# Patient Record
Sex: FEMALE | Race: ASIAN | NOT HISPANIC OR LATINO | Employment: UNEMPLOYED | URBAN - METROPOLITAN AREA
[De-identification: names, ages, dates, MRNs, and addresses within clinical notes are randomized per-mention and may not be internally consistent; named-entity substitution may affect disease eponyms.]

---

## 2017-01-09 ENCOUNTER — GENERIC CONVERSION - ENCOUNTER (OUTPATIENT)
Dept: OTHER | Facility: OTHER | Age: 52
End: 2017-01-09

## 2017-01-20 ENCOUNTER — GENERIC CONVERSION - ENCOUNTER (OUTPATIENT)
Dept: OTHER | Facility: OTHER | Age: 52
End: 2017-01-20

## 2017-01-23 ENCOUNTER — GENERIC CONVERSION - ENCOUNTER (OUTPATIENT)
Dept: OTHER | Facility: OTHER | Age: 52
End: 2017-01-23

## 2017-02-27 ENCOUNTER — ALLSCRIPTS OFFICE VISIT (OUTPATIENT)
Dept: OTHER | Facility: OTHER | Age: 52
End: 2017-02-27

## 2017-02-27 DIAGNOSIS — D64.9 ANEMIA: ICD-10-CM

## 2017-10-19 ENCOUNTER — GENERIC CONVERSION - ENCOUNTER (OUTPATIENT)
Dept: OTHER | Facility: OTHER | Age: 52
End: 2017-10-19

## 2017-10-19 ENCOUNTER — ALLSCRIPTS OFFICE VISIT (OUTPATIENT)
Dept: OTHER | Facility: OTHER | Age: 52
End: 2017-10-19

## 2017-10-19 DIAGNOSIS — Z12.31 ENCOUNTER FOR SCREENING MAMMOGRAM FOR MALIGNANT NEOPLASM OF BREAST: ICD-10-CM

## 2017-10-19 DIAGNOSIS — R53.83 OTHER FATIGUE: ICD-10-CM

## 2017-10-19 DIAGNOSIS — D64.9 ANEMIA: ICD-10-CM

## 2018-01-13 NOTE — PROGRESS NOTES
Assessment    1  Encounter for preventive health examination (V70 0) (Z00 00)   2  Anemia (285 9) (D64 9)    Plan  Anemia    · Ferrous Sulfate 324 (65 Fe) MG Oral Tablet Delayed Release; 1 TABLET TWICE  DAILY   · (1) CBC/PLT/DIFF; Status:Active; Requested for:29Aor2219;    · Call [phone] if: You feel unusually tired ; Status:Complete;   Done: 20HQQ8065   · Call [phone] if: Your skin, lips, or fingernails appear pale ; Status:Complete;    Done: 65BKO1113   · Seek Immediate Medical Attention if: You see any blood in the stool ; Status:Complete;    Done: [address] Maintenance    · Follow-up visit in 1 year Evaluation and Treatment  Follow-up  Status: Hold For -  Scheduling  Requested for: 71Pis0916   · Drink plenty of fluids ; Status:Complete;   Done: 47YDB5296   · We encourage all of our patients to exercise regularly  30 minutes of exercise or physical  activity five or more days a week is recommended for children and adults ;  Status:Complete;   Done: 52SUY7793   · We recommend routine visits to a dentist ; Status:Complete;   Done: 48SLV3685   · Call [phone] if: You find a new or different kind of lump in your breast ;  Status:Complete;   Done: 72OXI5492   · Call [phone] if: You have any warning signs of skin cancer ; Status:Complete;    Done: 79KIC0964   · Call [phone] if: You experience a new kind of chest pain (angina) or pressure ;  Status:Complete;   Done: 63EEC7575    Discussion/Summary  health maintenance visit Currently, she eats a healthy diet  the risks and benefits of cervical cancer screening were discussed cervical cancer screening is current next cervical cancer screening is due 2018 Breast cancer screening: mammogram is current  Colorectal cancer screening: colorectal cancer screening is current  Screening lab work includes hemoglobin  The patient declines immunizations       Health maintenance: up to date with screening colonoscopy, pap, breast  Next pap due in 2018  Lightheadedness due to anemia: refill iron, recheck labs  L inguinal/hip pain: due to ligamental strain; continue supportive care  Follow up 1 year  Self Referrals: No      Chief Complaint  Routine health maintenance      History of Present Illness  HM, Adult Female: The patient is being seen for a health maintenance evaluation  The last health maintenance visit was 3 year(s) ago  General Health: The patient's health since the last visit is described as fair  She does not have regular dental visits  She denies vision problems  She denies hearing loss  Immunizations status: not up to date  Lifestyle:  She consumes a diverse and healthy diet  She does not have any weight concerns  She does not exercise regularly  She does not use tobacco  She denies alcohol use  She denies drug use  Reproductive health: the patient is perimenopausal   she reports abnormal menses  she is sexually active  pregnancy history:  period 1-2 per month, now slowing down,   Screening: cancer screening reviewed and updated  metabolic screening reviewed and updated  HPI: Lightheadedness still  Sometime takes iron, sometime doesn't  Patient has period once or sometime twice a month  Skit on ice on L hip pain for a month  Pain is at L groin, relieved with some mediational oil  NO radiculopathy  Review of Systems    Constitutional: feeling tired  Eyes: No complaints of eye pain, no red eyes, no eyesight problems, no discharge, no dry eyes, no itching of eyes  ENT: no complaints of earache, no loss of hearing, no nose bleeds, no nasal discharge, no sore throat, no hoarseness  Cardiovascular: No complaints of slow heart rate, no fast heart rate, no chest pain, no palpitations, no leg claudication, no lower extremity edema  Respiratory: No complaints of shortness of breath, no wheezing, no cough, no SOB on exertion, no orthopnea, no PND     Gastrointestinal: No complaints of abdominal pain, no constipation, no nausea or vomiting, no diarrhea, no bloody stools  Musculoskeletal: limb pain, but as noted in HPI  Integumentary: No complaints of skin rash or lesions, no itching, no skin wounds, no breast pain or lump  Neurological: dizziness, but as noted in HPI  Psychiatric: Not suicidal, no sleep disturbance, no anxiety or depression, no change in personality, no emotional problems  Endocrine: No complaints of proptosis, no hot flashes, no muscle weakness, no deepening of the voice, no feelings of weakness  Active Problems    1  Anemia (285 9) (D64 9)   2  Colon cancer screening (V76 51) (Z12 11)   3  Encounter for routine gynecological examination (V72 31) (Z01 419)   4  Encounter for screening mammogram for breast cancer (V76 12) (Z12 31)   5  Esophageal reflux (530 81) (K21 9)   6  External Hemorrhoids (455 3)   7  Fatigue (780 79) (R53 83)   8  Premenopause menorrhagia (627 0) (N92 4)   9  Screening for diabetes mellitus (DM) (V77 1) (Z13 1)   10  Screening for lipoid disorders (V77 91) (Z13 220)   11  Urinary frequency (788 41) (R35 0)    Family History  Father    · Family history of skin cancer (V16 8) (Z80 8)    Social History    · Never a smoker   · No alcohol use    Current Meds   1  Ferrous Sulfate 324 (65 Fe) MG Oral Tablet Delayed Release; 1 TABLET TWICE DAILY; Therapy: 25PVA5290 to (Last Rx:07Oct2016)  Requested for: 84JXJ2762 Ordered    Allergies    1  No Known Drug Allergies    Vitals   Recorded: 47Wtr0013 10:50AM   Temperature 97 F   Heart Rate 80   Respiration 16   Systolic 543   Diastolic 60   Weight 699 lb 6 oz   BMI Calculated 24 42   BSA Calculated 1 62     Physical Exam    Constitutional   General appearance: No acute distress, well appearing and well nourished  Head and Face   Head and face: Normal     Eyes   Conjunctiva and lids: No swelling, erythema or discharge      Ears, Nose, Mouth, and Throat   External inspection of ears and nose: Normal     Otoscopic examination: Tympanic membranes translucent with normal light reflex  Canals patent without erythema  Oropharynx: Normal with no erythema, edema, exudate or lesions  Neck   Neck: Supple, symmetric, trachea midline, no masses  Thyroid: Normal, no thyromegaly  Pulmonary   Respiratory effort: No increased work of breathing or signs of respiratory distress  Auscultation of lungs: Clear to auscultation  Cardiovascular   Auscultation of heart: Normal rate and rhythm, normal S1 and S2, no murmurs  Examination of extremities for edema and/or varicosities: Normal     Abdomen   Abdomen: Non-tender, no masses  Liver and spleen: No hepatomegaly or splenomegaly  Lymphatic   Palpation of lymph nodes in neck: No lymphadenopathy  Musculoskeletal   Gait and station: Normal     Digits and nails: Normal without clubbing or cyanosis  Joints, bones, and muscles: Abnormal   Pain at L inguinal ligament and hip on external rotation  Range of motion: Normal     Stability: Normal     Muscle strength/tone: Normal     Skin   Skin and subcutaneous tissue: Normal without rashes or lesions  Psychiatric   Judgment and insight: Normal     Orientation to person, place, and time: Normal     Recent and remote memory: Intact      Mood and affect: Normal        Signatures   Electronically signed by : Verner Muff, M D ; Feb 28 2017 11:06AM EST                       (Author)

## 2018-01-14 VITALS
SYSTOLIC BLOOD PRESSURE: 100 MMHG | RESPIRATION RATE: 16 BRPM | TEMPERATURE: 97 F | DIASTOLIC BLOOD PRESSURE: 60 MMHG | BODY MASS INDEX: 24.42 KG/M2 | WEIGHT: 134.38 LBS | HEART RATE: 80 BPM

## 2018-01-22 ENCOUNTER — APPOINTMENT (OUTPATIENT)
Dept: LAB | Facility: CLINIC | Age: 53
End: 2018-01-22
Payer: COMMERCIAL

## 2018-01-22 ENCOUNTER — HOSPITAL ENCOUNTER (OUTPATIENT)
Dept: MAMMOGRAPHY | Facility: HOSPITAL | Age: 53
Discharge: HOME | End: 2018-01-22
Payer: COMMERCIAL

## 2018-01-22 VITALS
BODY MASS INDEX: 25.2 KG/M2 | SYSTOLIC BLOOD PRESSURE: 122 MMHG | TEMPERATURE: 98.2 F | HEART RATE: 74 BPM | RESPIRATION RATE: 14 BRPM | DIASTOLIC BLOOD PRESSURE: 72 MMHG | HEIGHT: 63 IN | WEIGHT: 142.25 LBS

## 2018-01-22 DIAGNOSIS — R53.83 OTHER FATIGUE: ICD-10-CM

## 2018-01-22 DIAGNOSIS — Z12.31 ENCOUNTER FOR SCREENING MAMMOGRAM FOR MALIGNANT NEOPLASM OF BREAST: ICD-10-CM

## 2018-01-22 DIAGNOSIS — D64.9 ANEMIA: ICD-10-CM

## 2018-01-22 LAB
BASOPHILS # BLD AUTO: 0.02 THOUSANDS/ΜL (ref 0–0.1)
BASOPHILS NFR BLD AUTO: 0 % (ref 0–1)
EOSINOPHIL # BLD AUTO: 0.1 THOUSAND/ΜL (ref 0–0.61)
EOSINOPHIL NFR BLD AUTO: 1 % (ref 0–6)
ERYTHROCYTE [DISTWIDTH] IN BLOOD BY AUTOMATED COUNT: 22.1 % (ref 11.6–15.1)
FERRITIN SERPL-MCNC: 600 NG/ML (ref 8–388)
HCT VFR BLD AUTO: 31.6 % (ref 34.8–46.1)
HGB BLD-MCNC: 9.3 G/DL (ref 11.5–15.4)
IRON SERPL-MCNC: 125 UG/DL (ref 50–170)
LYMPHOCYTES # BLD AUTO: 1.81 THOUSANDS/ΜL (ref 0.6–4.47)
LYMPHOCYTES NFR BLD AUTO: 24 % (ref 14–44)
MCH RBC QN AUTO: 18.5 PG (ref 26.8–34.3)
MCHC RBC AUTO-ENTMCNC: 29.4 G/DL (ref 31.4–37.4)
MCV RBC AUTO: 63 FL (ref 82–98)
MONOCYTES # BLD AUTO: 0.45 THOUSAND/ΜL (ref 0.17–1.22)
MONOCYTES NFR BLD AUTO: 6 % (ref 4–12)
NEUTROPHILS # BLD AUTO: 5.24 THOUSANDS/ΜL (ref 1.85–7.62)
NEUTS SEG NFR BLD AUTO: 69 % (ref 43–75)
NRBC BLD AUTO-RTO: 0 /100 WBCS
PLATELET # BLD AUTO: 352 THOUSANDS/UL (ref 149–390)
RBC # BLD AUTO: 5.03 MILLION/UL (ref 3.81–5.12)
TSH SERPL DL<=0.05 MIU/L-ACNC: 1.97 UIU/ML (ref 0.36–3.74)
WBC # BLD AUTO: 7.65 THOUSAND/UL (ref 4.31–10.16)

## 2018-01-22 PROCEDURE — 77063 BREAST TOMOSYNTHESIS BI: CPT

## 2018-01-22 PROCEDURE — 82728 ASSAY OF FERRITIN: CPT

## 2018-01-22 PROCEDURE — 85025 COMPLETE CBC W/AUTO DIFF WBC: CPT

## 2018-01-22 PROCEDURE — 36415 COLL VENOUS BLD VENIPUNCTURE: CPT

## 2018-01-22 PROCEDURE — 84443 ASSAY THYROID STIM HORMONE: CPT

## 2018-01-22 PROCEDURE — 83540 ASSAY OF IRON: CPT

## 2018-01-22 PROCEDURE — 77067 SCR MAMMO BI INCL CAD: CPT

## 2018-07-13 ENCOUNTER — OFFICE VISIT (OUTPATIENT)
Dept: FAMILY MEDICINE CLINIC | Facility: CLINIC | Age: 53
End: 2018-07-13
Payer: COMMERCIAL

## 2018-07-13 VITALS
RESPIRATION RATE: 16 BRPM | TEMPERATURE: 95.9 F | HEART RATE: 80 BPM | DIASTOLIC BLOOD PRESSURE: 64 MMHG | WEIGHT: 141.6 LBS | BODY MASS INDEX: 25.09 KG/M2 | SYSTOLIC BLOOD PRESSURE: 100 MMHG | HEIGHT: 63 IN

## 2018-07-13 DIAGNOSIS — Z11.1 PPD SCREENING TEST: ICD-10-CM

## 2018-07-13 DIAGNOSIS — L85.3 DRY SKIN: ICD-10-CM

## 2018-07-13 DIAGNOSIS — Z02.1 ENCOUNTER FOR PRE-EMPLOYMENT EXAMINATION: Primary | ICD-10-CM

## 2018-07-13 PROBLEM — Z00.00 HEALTH CARE MAINTENANCE: Status: ACTIVE | Noted: 2018-07-13

## 2018-07-13 PROCEDURE — 99213 OFFICE O/P EST LOW 20 MIN: CPT | Performed by: FAMILY MEDICINE

## 2018-07-13 PROCEDURE — 86580 TB INTRADERMAL TEST: CPT | Performed by: FAMILY MEDICINE

## 2018-07-13 RX ORDER — PETROLATUM,WHITE
1 OINTMENT IN PACKET (GRAM) TOPICAL ONCE
Qty: 212 G | Refills: 1 | Status: SHIPPED | OUTPATIENT
Start: 2018-07-13 | End: 2018-07-13

## 2018-07-13 RX ORDER — FERROUS SULFATE 325(65) MG
1 TABLET ORAL 2 TIMES DAILY
COMMUNITY
Start: 2016-09-29 | End: 2019-11-04 | Stop reason: SDUPTHER

## 2018-07-13 NOTE — ASSESSMENT & PLAN NOTE
- generally healthy, no medical complaints except for h/o PUD 6 years ago, currently asymptomatic   - on Ampicillin due to recently undergoing dental procedure 7/5/18, no significantly findings  --> come back on Monday for PPD read then I will complete the form

## 2018-07-13 NOTE — PROGRESS NOTES
Henry Hinojosa 1965 female MRN: [id number]    Family Medicine Acute Visit    ASSESSMENT/PLAN   Problem List Items Addressed This Visit        Other    Encounter for pre-employment examination - Primary     - generally healthy, no medical complaints except for h/o PUD 6 years ago, currently asymptomatic   - on Ampicillin due to recently undergoing dental procedure 7/5/18, no significantly findings  --> come back on Monday for PPD read then I will complete the form               Dry skin     - lotion application instructions given today                   Future Appointments  Date Time Provider Brigitte Dill   7/16/2018 11:00 AM Elke CHRISTIANSON  Practice[facility]          SUBJECTIVE  CC: Physical Exam      HPI:  Karla Li is a 45 y o  female who presents for pre-employment PE   PT stated she is doing well, eating/drinking/sleeping well  No HA, weakness, paresthesia, viral infection, fever, nightsweat, CP, SOB, abd pain  No changes in stool or weight  No n/v  No travel recently  Review of Systems   Constitutional: Negative for activity change, appetite change, chills, diaphoresis and fever  HENT: Negative for ear discharge, ear pain, hearing loss, rhinorrhea, sinus pain, sneezing, sore throat, tinnitus and trouble swallowing  Eyes: Negative for photophobia, pain, discharge, redness and visual disturbance  Respiratory: Negative for cough, shortness of breath and wheezing  Cardiovascular: Negative for chest pain and palpitations  Gastrointestinal: Negative for abdominal distention, abdominal pain, constipation, diarrhea, nausea and vomiting  Genitourinary: Negative for dysuria, flank pain, hematuria and vaginal pain  Musculoskeletal: Negative for back pain, neck pain and neck stiffness  Skin: Negative for pallor and rash  Neurological: Negative for seizures, syncope, weakness, light-headedness, numbness and headaches  Hematological: Does not bruise/bleed easily  Psychiatric/Behavioral: Negative for dysphoric mood  The patient is not nervous/anxious  Historical Information   The patient history was reviewed as follows:  No past medical history on file  No past surgical history on file  Family History   Problem Relation Age of Onset    Skin cancer Father       Social History   History   Alcohol Use No     History   Drug use: Unknown     History   Smoking Status    Never Smoker   Smokeless Tobacco    Not on file       Medications:     Current Outpatient Prescriptions:     ferrous sulfate 325 (65 Fe) mg tablet, Take 1 tablet by mouth 2 (two) times a day, Disp: , Rfl:     Allergies not on file    OBJECTIVE  Vitals:   Vitals:    07/13/18 1123   BP: 100/64   Pulse: 80   Resp: 16   Temp: (!) 95 9 °F (35 5 °C)   Weight: 64 2 kg (141 lb 9 6 oz)   Height: 5' 3" (1 6 m)         Physical Exam   Constitutional: She is oriented to person, place, and time  She appears well-developed and well-nourished  No distress  HENT:   Head: Normocephalic and atraumatic  Right Ear: External ear normal    Left Ear: External ear normal    Nose: Nose normal    Mouth/Throat: Oropharynx is clear and moist  No oropharyngeal exudate  No tenderness, bleeding, erythema, pain, edema on gum, teeth   Eyes: Conjunctivae and EOM are normal  Pupils are equal, round, and reactive to light  Right eye exhibits no discharge  Left eye exhibits no discharge  No scleral icterus  Neck: Normal range of motion  Neck supple  No JVD present  No thyromegaly present  Cardiovascular: Normal rate, regular rhythm and normal heart sounds  Exam reveals no gallop and no friction rub  No murmur heard  Pulmonary/Chest: Effort normal and breath sounds normal  No respiratory distress  She has no wheezes  She has no rales  Abdominal: Soft  Bowel sounds are normal  She exhibits no distension  There is no tenderness  There is no rebound and no guarding     Genitourinary: Vagina normal    Musculoskeletal: Normal range of motion  She exhibits no edema, tenderness or deformity  Lymphadenopathy:     She has no cervical adenopathy  Neurological: She is alert and oriented to person, place, and time  No cranial nerve deficit  Motor strength 5/5 thoughout   Sensation intact and symmetric  Cn 2-12 intact     Skin: Skin is dry  No rash noted  She is not diaphoretic  No erythema  Dry skin, some excoriation on abdomen   Psychiatric: She has a normal mood and affect  Thought content normal    Vitals reviewed         Papo Padron MD, PGY-2  [facility]   7/13/2018

## 2018-07-16 ENCOUNTER — CLINICAL SUPPORT (OUTPATIENT)
Dept: FAMILY MEDICINE CLINIC | Facility: CLINIC | Age: 53
End: 2018-07-16

## 2018-07-16 DIAGNOSIS — Z11.1 PPD SCREENING TEST: ICD-10-CM

## 2018-07-16 DIAGNOSIS — R76.11 POSITIVE SKIN TEST FOR TUBERCULOSIS: Primary | ICD-10-CM

## 2018-07-16 LAB
INDURATION: 15 MM
TB SKIN TEST: POSITIVE

## 2018-07-18 LAB
M TB IFN-G CD4+ BCKGRND COR BLD-ACNC: 0.63 IU/ML
M TB IFN-G CD4+ T-CELLS BLD-ACNC: 0.05 IU/ML
M TB TUBERC IFN-G BLD QL: POSITIVE
M TB TUBERC IGNF/MITOGEN IGNF CONTROL: >10 IU/ML

## 2018-07-20 ENCOUNTER — HOSPITAL ENCOUNTER (OUTPATIENT)
Dept: RADIOLOGY | Facility: HOSPITAL | Age: 53
Discharge: HOME | End: 2018-07-20
Payer: COMMERCIAL

## 2018-07-20 ENCOUNTER — TRANSCRIBE ORDERS (OUTPATIENT)
Dept: ADMINISTRATIVE | Facility: HOSPITAL | Age: 53
End: 2018-07-20

## 2018-07-20 DIAGNOSIS — R76.11 POSITIVE PPD: ICD-10-CM

## 2018-07-20 DIAGNOSIS — R76.11 POSITIVE PPD: Primary | ICD-10-CM

## 2018-07-20 PROCEDURE — 71046 X-RAY EXAM CHEST 2 VIEWS: CPT

## 2018-07-20 NOTE — PROGRESS NOTES
PPD 15 and QuantiFERON (+); Pt stated that she had never had positive PPD before  She underwent immigration process before immigrating to the [address] [place] [address] [address] which was negative for both active and latent TB  Never had BCG vaccine before  I talked with the patient over the phone, requested to her to obtain CXR ASAP, will decide the treatment after having the result  Also talked with her  over the phone, encouraged him to screen for TB infection as well  However, he strongly stated that he believes everyone from [facility] must have positive PPD and almost [place] has active TB, and he does not trust the result  Discussed with Dr Colt Peck

## 2018-07-23 ENCOUNTER — TELEPHONE (OUTPATIENT)
Dept: FAMILY MEDICINE CLINIC | Facility: CLINIC | Age: 53
End: 2018-07-23

## 2018-07-23 NOTE — TELEPHONE ENCOUNTER
Pt came to the office looking for information regarding her CXR and quantiferon gold test q gold was positive just waiting for the reading on the CXR I am not sure if you would like to

## 2018-07-23 NOTE — TELEPHONE ENCOUNTER
Pt CXR is back and is negative  With positive quantiferon if you can call pt or I can to give treatment plan, pt want a call at [phone] and if no answer call her at [phone]   Thank you

## 2018-08-02 DIAGNOSIS — R76.11 POSITIVE PPD: Primary | ICD-10-CM

## 2018-08-02 NOTE — PROGRESS NOTES
I called the patient and left the message that she needs to go see ID doctor, Dr Howard Early, regarding her positive PPD and Quantiferon, negative CXR  She can either  the referral or have it mailed to her

## 2018-08-02 NOTE — TELEPHONE ENCOUNTER
I saw her dad this morning, and she came in with him  I gave her a referral to Dr Pam Taylor, infectious disease

## 2018-08-20 ENCOUNTER — TELEPHONE (OUTPATIENT)
Dept: FAMILY MEDICINE CLINIC | Facility: CLINIC | Age: 53
End: 2018-08-20

## 2018-09-13 ENCOUNTER — OFFICE VISIT (OUTPATIENT)
Dept: INFECTIOUS DISEASES | Facility: CLINIC | Age: 53
End: 2018-09-13
Payer: COMMERCIAL

## 2018-09-13 VITALS
HEIGHT: 62 IN | HEART RATE: 72 BPM | WEIGHT: 144 LBS | DIASTOLIC BLOOD PRESSURE: 78 MMHG | TEMPERATURE: 97.5 F | SYSTOLIC BLOOD PRESSURE: 112 MMHG | BODY MASS INDEX: 26.5 KG/M2

## 2018-09-13 DIAGNOSIS — R76.11 POSITIVE PPD: ICD-10-CM

## 2018-09-13 DIAGNOSIS — Z22.7 TB LUNG, LATENT: ICD-10-CM

## 2018-09-13 DIAGNOSIS — R76.12 POSITIVE QUANTIFERON-TB GOLD TEST: ICD-10-CM

## 2018-09-13 PROCEDURE — 99244 OFF/OP CNSLTJ NEW/EST MOD 40: CPT | Performed by: INTERNAL MEDICINE

## 2018-09-13 RX ORDER — PYRIDOXINE HCL (VITAMIN B6) 50 MG
50 TABLET ORAL DAILY
Qty: 90 TABLET | Refills: 2 | Status: SHIPPED | OUTPATIENT
Start: 2018-09-13 | End: 2018-09-14 | Stop reason: SDUPTHER

## 2018-09-13 RX ORDER — ISONIAZID 300 MG/1
300 TABLET ORAL DAILY
Qty: 90 TABLET | Refills: 2 | Status: SHIPPED | OUTPATIENT
Start: 2018-09-13 | End: 2018-09-14 | Stop reason: SDUPTHER

## 2018-09-13 NOTE — PROGRESS NOTES
Consultation - Infectious Disease   Karla Jacobsen 52 y o  female MRN: [id number]  Unit/Bed#:  Encounter: [id number]      IMPRESSION & RECOMMENDATIONS:   Impression/Recommendations: This is a 52 y o  female, otherwise healthy, immigrated from [facility] in 1987, via immigration camp in [facility], now has positive PPD and TB QuantiFERON gold  1    Latent TB  Infection was probably during her time in bed hand or immigration camp, especially given negative PPD in a 59-year-old daughter and Sinai Clancy positive PPD on entry to [address] [place] [address]  patient has no clinical signs of active TB  CXR is normal   LFTs were normal 3 years ago  Will repeat prior to [address]  Will start patient on daily INH  Start  mg daily x 9 months  Start pyridoxine 50 mg daily with INH  Will check pre treatment LFTs  If pre treat LFTs are normal, there is no need for routine serial LFTs, unless patient developed GI symptoms  Follow-up in 3 months  Outpatient records reviewed in detail  Discussed with patient in detail regarding above plan  Follow-up in 3 months  Thank you for this consultation  HISTORY OF PRESENT ILLNESS:  Reason for Consult:   Latent TB     HPI: Karla Jacobsen is a 52 y o  female, otherwise healthy, had routine PPD done for insurance reasons  This was positive  This was follow with QuantiFERON TB gold, which was also positive  For these reasons, she was referred here for evaluation  Patient feels well  No respiratory symptoms  No constitutional symptoms  Patient is from [facility], came to [address] [place] [address]  in [address] [place] [address] via immigration camp in [facility] for 3 years  She describes a very crowded and dirty and farm and there  [place] having a PPD that was weakly positive   Patient denies any known TB exposure  She has a 59-year-old daughter who had negative PPD last year  REVIEW OF SYSTEMS:  A complete 12 point system-based review of systems was done    Except for what is noted in HPI above, ROS is otherwise negative  PAST MEDICAL HISTORY:  No past medical history on file  No past surgical history on file  Problem list reviewed  FAMILY HISTORY:  Non-contributory    SOCIAL HISTORY:  History   Alcohol Use No     History   Drug use: Unknown     History   Smoking Status    Never Smoker   Smokeless Tobacco    Never Used       ALLERGIES:  Allergies   Allergen Reactions    Motrin [Ibuprofen] Other (See Comments)     Upset stomach       MEDICATIONS:  All current active medications have been reviewed  Patient is currently not on any antibiotic  PHYSICAL EXAM:  Vitals:  Temperature: 97 5 °F (36 4 °C)     Physical Exam:  General:  Well-nourished, well-developed, in no acute distress  Awake, alert and oriented x 3  Eyes:  Conjunctive clear with no hemorrhages or effusions  Oropharynx:  No ulcers, no lesions, pharynx benign, no tonsillitis  Neck:  Supple, no lymphadenopathy, no mass, nontender  Lungs:  Expansion symmetric, no rales, no wheezing, no accessory muscle use  Cardiac:  Regular rate and rhythm, normal S1, normal S2, no murmurs  Abdomen:  Soft, nondistended, non-tender, no HSM  Extremities:  No edema, no erythema, nontender  No ulcers  Skin:  No rashes, no ulcers  Neurological:  Moves all four extremities spontaneously, sensation grossly intact    LABS, IMAGING, & OTHER STUDIES:  Lab Results:  I have personally reviewed pertinent labs  Imaging Studies:   I have personally reviewed pertinent imaging study reports and images in PACS  CXR reviewed personally  No infiltrates or consolidations  EKG, Pathology, and Other Studies:   I have personally reviewed pertinent reports

## 2018-09-14 DIAGNOSIS — Z22.7 TB LUNG, LATENT: ICD-10-CM

## 2018-09-14 RX ORDER — PYRIDOXINE HCL (VITAMIN B6) 50 MG
50 TABLET ORAL DAILY
Qty: 90 TABLET | Refills: 0 | Status: SHIPPED | OUTPATIENT
Start: 2018-09-14 | End: 2019-06-11

## 2018-09-14 RX ORDER — ISONIAZID 300 MG/1
300 TABLET ORAL DAILY
Qty: 90 TABLET | Refills: 0 | Status: SHIPPED | OUTPATIENT
Start: 2018-09-14 | End: 2018-12-18 | Stop reason: SDUPTHER

## 2018-12-13 ENCOUNTER — OFFICE VISIT (OUTPATIENT)
Dept: INFECTIOUS DISEASES | Facility: CLINIC | Age: 53
End: 2018-12-13
Payer: COMMERCIAL

## 2018-12-13 VITALS
DIASTOLIC BLOOD PRESSURE: 62 MMHG | HEIGHT: 62 IN | WEIGHT: 142 LBS | HEART RATE: 70 BPM | SYSTOLIC BLOOD PRESSURE: 110 MMHG | RESPIRATION RATE: 16 BRPM | BODY MASS INDEX: 26.13 KG/M2 | TEMPERATURE: 97 F

## 2018-12-13 DIAGNOSIS — Z22.7 TB LUNG, LATENT: Primary | ICD-10-CM

## 2018-12-13 PROCEDURE — 99214 OFFICE O/P EST MOD 30 MIN: CPT | Performed by: INTERNAL MEDICINE

## 2018-12-13 NOTE — PROGRESS NOTES
Progress Note - Infectious Disease   Karla Brown 52 y o  female MRN: [id number]  Unit/Bed#:  Encounter: [id number]      Impression/Recommendations:  1  Latent TB  Infection was probably during her time in bed hand or immigration camp, especially given negative PPD in a 17-year-old daughter and Gurinder Alvarado positive PPD on entry to [address] [place] [address]  patient has no clinical signs of active TB  CXR is normal   LFTs were normal  Patient is tolerating INH well  Continue INH with pyridoxine  Treat x9 months total, another 6 months      Reviewed treatment plan with patient again in detail  Follow-up with me p r n  Antibiotics:  INH with pyridoxine x3 months  Subjective:  Patient is tolerating INH well  She is taking pyridoxine with it  No nausea, vomiting or diarrhea  Appetite is good  No abdominal pain  No numbness or paresthesia  The following portions of the patient's history were reviewed and updated as appropriate: allergies, current medications, past medical history, past social history, past surgical history and problem list     Objective:  Vitals:  Temperature: (!) 97 °F (36 1 °C)    Physical Exam:     General: Awake, alert, cooperative, no distress  Neck:  Supple  No lymphadenopathy  No mass  Lungs: Expansion symmetric, no rales, no wheezing, respirations unlabored  Heart:  Regular rate and rhythm, S1 and S2 normal, no murmur  Abdomen: Soft, nondistended, non-tender, bowel sounds active all four quadrants,        no masses, no organomegaly  Extremities: No edema  No erythema/warmth  No ulcer  Nontender to palpation  Skin:  No rash  Neuro: Moves all extremities  Invasive Devices          No matching active lines, drains, or airways          Labs studies:   I have personally reviewed pertinent labs  Imaging Studies:   I have personally reviewed pertinent imaging study reports and images in PACS  EKG, Pathology, and Other Studies:   I have personally reviewed pertinent reports

## 2018-12-18 DIAGNOSIS — Z22.7 TB LUNG, LATENT: ICD-10-CM

## 2018-12-18 RX ORDER — PYRIDOXINE HCL (VITAMIN B6) 50 MG
50 TABLET ORAL DAILY
Qty: 90 TABLET | Refills: 1 | Status: SHIPPED | OUTPATIENT
Start: 2018-12-18 | End: 2019-03-18

## 2018-12-18 RX ORDER — ISONIAZID 300 MG/1
300 TABLET ORAL DAILY
Qty: 90 TABLET | Refills: 1 | Status: SHIPPED | OUTPATIENT
Start: 2018-12-18 | End: 2019-03-18

## 2019-03-13 ENCOUNTER — OFFICE VISIT (OUTPATIENT)
Dept: FAMILY MEDICINE CLINIC | Facility: CLINIC | Age: 54
End: 2019-03-13

## 2019-03-13 VITALS
WEIGHT: 143.2 LBS | DIASTOLIC BLOOD PRESSURE: 70 MMHG | HEART RATE: 74 BPM | HEIGHT: 62 IN | BODY MASS INDEX: 26.35 KG/M2 | SYSTOLIC BLOOD PRESSURE: 110 MMHG | RESPIRATION RATE: 14 BRPM | TEMPERATURE: 98.2 F

## 2019-03-13 DIAGNOSIS — R31.9 URINARY TRACT INFECTION WITH HEMATURIA, SITE UNSPECIFIED: Primary | ICD-10-CM

## 2019-03-13 DIAGNOSIS — N39.0 URINARY TRACT INFECTION WITH HEMATURIA, SITE UNSPECIFIED: Primary | ICD-10-CM

## 2019-03-13 LAB
SL AMB  POCT GLUCOSE, UA: ABNORMAL
SL AMB LEUKOCYTE ESTERASE,UA: ABNORMAL
SL AMB POCT BILIRUBIN,UA: ABNORMAL
SL AMB POCT BLOOD,UA: ABNORMAL
SL AMB POCT CLARITY,UA: ABNORMAL
SL AMB POCT COLOR,UA: YELLOW
SL AMB POCT KETONES,UA: ABNORMAL
SL AMB POCT NITRITE,UA: POSITIVE
SL AMB POCT PH,UA: 6
SL AMB POCT SPECIFIC GRAVITY,UA: 1.01
SL AMB POCT URINE PROTEIN: ABNORMAL
SL AMB POCT UROBILINOGEN: 0.2

## 2019-03-13 PROCEDURE — 99213 OFFICE O/P EST LOW 20 MIN: CPT | Performed by: FAMILY MEDICINE

## 2019-03-13 PROCEDURE — 81003 URINALYSIS AUTO W/O SCOPE: CPT | Performed by: FAMILY MEDICINE

## 2019-03-13 PROCEDURE — 87086 URINE CULTURE/COLONY COUNT: CPT | Performed by: STUDENT IN AN ORGANIZED HEALTH CARE EDUCATION/TRAINING PROGRAM

## 2019-03-13 PROCEDURE — 3008F BODY MASS INDEX DOCD: CPT | Performed by: FAMILY MEDICINE

## 2019-03-13 PROCEDURE — 87077 CULTURE AEROBIC IDENTIFY: CPT | Performed by: STUDENT IN AN ORGANIZED HEALTH CARE EDUCATION/TRAINING PROGRAM

## 2019-03-13 PROCEDURE — 87186 SC STD MICRODIL/AGAR DIL: CPT | Performed by: STUDENT IN AN ORGANIZED HEALTH CARE EDUCATION/TRAINING PROGRAM

## 2019-03-13 RX ORDER — SULFAMETHOXAZOLE AND TRIMETHOPRIM 800; 160 MG/1; MG/1
1 TABLET ORAL EVERY 12 HOURS SCHEDULED
Qty: 14 TABLET | Refills: 0 | Status: SHIPPED | OUTPATIENT
Start: 2019-03-13 | End: 2019-03-20

## 2019-03-13 NOTE — ASSESSMENT & PLAN NOTE
Vital stable in office  Patient is afebrile this morning  Urine dipstick positive for nitrites, leukocytes, trace blood, protein  Urine sent for urine culture  Requisition given to patient to CBC and CMP  Start Bactrim DS, 1 tablets p o  Q 12h x7 days   Patient has no known allergies to sulfa  Tylenol 975mg  p o  Q 8h for 2-3 days scheduled   Patient is allergic to aspirin as result of which, I am unable to prescribe any NSAIDs   Advised to increase water intake   Will defer renal CT scan at this visit      Patient to follow-up with Dr Demetris Mittal in 2 weeks, will repeat urine dip at that time  Discussed with patient that if pain and symptoms worsen to RTC

## 2019-03-13 NOTE — PROGRESS NOTES
Assessment/Plan:    Urinary tract infection with hematuria   Vital stable in office  Patient is afebrile this morning  Urine dipstick positive for nitrites, leukocytes, trace blood, protein  Urine sent for urine culture  Requisition given to patient to CBC and CMP  Start Bactrim DS, 1 tablets p o  Q 12h x7 days   Patient has no known allergies to sulfa  Tylenol 975mg  p o  Q 8h for 2-3 days scheduled   Patient is allergic to aspirin as result of which, I am unable to prescribe any NSAIDs   Advised to increase water intake   Will defer renal CT scan at this visit  Patient to follow-up with Dr Walker Bajwa in 2 weeks, will repeat urine dip at that time  Discussed with patient that if pain and symptoms worsen to RTC      Subjective:      Patient ID: Tamika Manrique is a 52 y o  female  HPI  This is a 52-year-old female with  a past medical history of anemia and GERD who presents with a 3 day history of nausea  Patient states that she had been shoveling snow 5 days ago after which she attributes the start of her symptoms  She further explains that she had 2 episodes of emesis yesterday which were nonprojectile, nonbloody, non bilious and 1 episode today  and has had an associated decreased appetite  Over the past 2 days and she has also developed right flank pain  She describes the pain as persistent, progressively worsening, radiating partially to her right groin,  graded 8/10 in intensity and relieved by rest   She also admits that she has had fever and chills over the past 24-48 hours however no documented temperature at home  She continues to increased her fluid intake drinking up to 4 bottles of water daily however believes that she feels more unwell today than yesterday as she feels very weak and dizzy  Patient denies any urinary frequency, urgency, dysuria, hematuria, decreased urine or suprapubic pain  No vaginal bleeding or vaginal discharge and  Ms Naldo Freire denies any prior history of renal calculi  Review of Systems   Constitutional: Positive for activity change, appetite change, chills and fever  HENT: Negative for congestion, ear pain, rhinorrhea and sore throat  Eyes: Negative for discharge  Respiratory: Negative for cough, chest tightness, shortness of breath, wheezing and stridor  Cardiovascular: Negative for chest pain, palpitations and leg swelling  Gastrointestinal: Negative for abdominal pain, constipation, diarrhea, nausea and vomiting  Right flank pain   Genitourinary: Positive for flank pain  Negative for decreased urine volume, dysuria, enuresis, frequency, hematuria, pelvic pain, urgency and vaginal bleeding  Musculoskeletal: Negative for arthralgias and back pain  Skin: Negative for color change, pallor, rash and wound  Neurological: Positive for dizziness and weakness  Negative for syncope, facial asymmetry, light-headedness and headaches  Objective:      /70 (BP Location: Right arm, Patient Position: Sitting, Cuff Size: Standard)   Pulse 74   Temp 98 2 °F (36 8 °C) (Tympanic)   Resp 14   Ht 5' 2" (1 575 m)   Wt 65 kg (143 lb 3 2 oz)   BMI 26 19 kg/m²          Physical Exam   Constitutional: She appears well-developed and well-nourished  She appears distressed  Patient had does appear to be in mild painful distress due to right flank pain   HENT:   Head: Normocephalic and atraumatic  Nose: Nose normal    Mouth/Throat: Oropharynx is clear and moist  No oropharyngeal exudate  Eyes: Conjunctivae are normal  Right eye exhibits no discharge  Left eye exhibits no discharge  No scleral icterus  Mucous membranes pink and moist   Neck: Normal range of motion  Neck supple  No JVD present  Cardiovascular: Normal rate, regular rhythm, normal heart sounds and intact distal pulses  Exam reveals no gallop and no friction rub  No murmur heard  Pulmonary/Chest: Effort normal and breath sounds normal  No stridor  No respiratory distress  She has no wheezes  She has no rales  She exhibits no tenderness  Abdominal: Soft  Bowel sounds are normal  She exhibits no distension and no mass  There is no tenderness  There is no rebound and no guarding  Right CVA tenderness   Musculoskeletal: Normal range of motion  She exhibits no edema, tenderness or deformity  Neurological: She is alert  She has normal reflexes  Skin: Skin is warm  No rash noted  She is not diaphoretic  No erythema  No pallor  Psychiatric: She has a normal mood and affect

## 2019-03-15 LAB
ALBUMIN SERPL-MCNC: 4.1 G/DL (ref 3.6–5.1)
ALBUMIN/GLOB SERPL: 1.4 (CALC) (ref 1–2.5)
ALP SERPL-CCNC: 79 U/L (ref 33–130)
ALT SERPL-CCNC: 47 U/L (ref 6–29)
AST SERPL-CCNC: 33 U/L (ref 10–35)
BASOPHILS # BLD AUTO: 17 CELLS/UL (ref 0–200)
BASOPHILS NFR BLD AUTO: 0.2 %
BILIRUB SERPL-MCNC: 1.4 MG/DL (ref 0.2–1.2)
BUN SERPL-MCNC: 14 MG/DL (ref 7–25)
BUN/CREAT SERPL: ABNORMAL (CALC) (ref 6–22)
CALCIUM SERPL-MCNC: 8.6 MG/DL (ref 8.6–10.4)
CHLORIDE SERPL-SCNC: 103 MMOL/L (ref 98–110)
CO2 SERPL-SCNC: 27 MMOL/L (ref 20–32)
CREAT SERPL-MCNC: 0.59 MG/DL (ref 0.5–1.05)
EOSINOPHIL # BLD AUTO: 0 CELLS/UL (ref 15–500)
EOSINOPHIL NFR BLD AUTO: 0 %
ERYTHROCYTE [DISTWIDTH] IN BLOOD BY AUTOMATED COUNT: 20.4 % (ref 11–15)
GLOBULIN SER CALC-MCNC: 3 G/DL (CALC) (ref 1.9–3.7)
GLUCOSE SERPL-MCNC: 93 MG/DL (ref 65–99)
HCT VFR BLD AUTO: 30 % (ref 35–45)
HGB BLD-MCNC: 8.7 G/DL (ref 11.7–15.5)
LYMPHOCYTES # BLD AUTO: 706 CELLS/UL (ref 850–3900)
LYMPHOCYTES NFR BLD AUTO: 8.3 %
MCH RBC QN AUTO: 18.8 PG (ref 27–33)
MCHC RBC AUTO-ENTMCNC: 29 G/DL (ref 32–36)
MCV RBC AUTO: 64.7 FL (ref 80–100)
MONOCYTES # BLD AUTO: 510 CELLS/UL (ref 200–950)
MONOCYTES NFR BLD AUTO: 6 %
NEUTROPHILS # BLD AUTO: 7268 CELLS/UL (ref 1500–7800)
NEUTROPHILS NFR BLD AUTO: 85.5 %
PLATELET # BLD AUTO: 294 THOUSAND/UL (ref 140–400)
PMV BLD REES-ECKER: ABNORMAL FL
POTASSIUM SERPL-SCNC: 4.3 MMOL/L (ref 3.5–5.3)
PROT SERPL-MCNC: 7.1 G/DL (ref 6.1–8.1)
RBC # BLD AUTO: 4.64 MILLION/UL (ref 3.8–5.1)
SERVICE CMNT-IMP: ABNORMAL
SL AMB EGFR AFRICAN AMERICAN: 120 ML/MIN/1.73M2
SL AMB EGFR NON AFRICAN AMERICAN: 103 ML/MIN/1.73M2
SODIUM SERPL-SCNC: 136 MMOL/L (ref 135–146)
WBC # BLD AUTO: 8.5 THOUSAND/UL (ref 3.8–10.8)

## 2019-03-17 LAB — BACTERIA UR CULT: ABNORMAL

## 2019-03-18 ENCOUNTER — TELEPHONE (OUTPATIENT)
Dept: GERIATRICS | Age: 54
End: 2019-03-18

## 2019-03-18 NOTE — TELEPHONE ENCOUNTER
Spoke with patient and  today on the telephone and informed patient that her urine culture was positive for E coli and was susceptible to Bactrim which patient was prescribed  Patient and  do not fluently least speak English and therefore had to repeat information multiple times to them  Also explained to patient and  that her blood work was abnormal as she was anemic given her hemoglobin of 8 7 with indices showing a microcytic hypochromic anemia  I explained that she will need further workup  for anemia, possible CT scan for renal calculi and a colonoscopy given her anemia  Patient did verify that she feels much better, did not have any further flank pain, fever or chills  I explained that  it was very imperative that the patient must follow up with her scheduled appointment with Dr Bassam Herbert next week especially given patient's abnormal CBC    Patient verbalized understanding

## 2019-03-28 ENCOUNTER — OFFICE VISIT (OUTPATIENT)
Dept: FAMILY MEDICINE CLINIC | Facility: CLINIC | Age: 54
End: 2019-03-28

## 2019-03-28 VITALS
HEART RATE: 76 BPM | WEIGHT: 147.2 LBS | RESPIRATION RATE: 20 BRPM | SYSTOLIC BLOOD PRESSURE: 118 MMHG | BODY MASS INDEX: 26.08 KG/M2 | TEMPERATURE: 97.2 F | HEIGHT: 63 IN | DIASTOLIC BLOOD PRESSURE: 68 MMHG

## 2019-03-28 DIAGNOSIS — K64.4 EXTERNAL HEMORRHOID: ICD-10-CM

## 2019-03-28 DIAGNOSIS — R07.89 ANTERIOR CHEST WALL PAIN: ICD-10-CM

## 2019-03-28 DIAGNOSIS — N39.0 URINARY TRACT INFECTION WITH HEMATURIA, SITE UNSPECIFIED: ICD-10-CM

## 2019-03-28 DIAGNOSIS — R31.9 URINARY TRACT INFECTION WITH HEMATURIA, SITE UNSPECIFIED: ICD-10-CM

## 2019-03-28 DIAGNOSIS — S00.81XA FACIAL ABRASION, INITIAL ENCOUNTER: ICD-10-CM

## 2019-03-28 DIAGNOSIS — Z13.6 SCREENING FOR CARDIOVASCULAR CONDITION: ICD-10-CM

## 2019-03-28 DIAGNOSIS — Z00.00 HEALTH MAINTENANCE EXAMINATION: Primary | ICD-10-CM

## 2019-03-28 DIAGNOSIS — D50.8 IRON DEFICIENCY ANEMIA SECONDARY TO INADEQUATE DIETARY IRON INTAKE: ICD-10-CM

## 2019-03-28 DIAGNOSIS — Z12.4 SCREENING FOR CERVICAL CANCER: ICD-10-CM

## 2019-03-28 DIAGNOSIS — Z12.39 SCREENING FOR BREAST CANCER: ICD-10-CM

## 2019-03-28 PROCEDURE — 99396 PREV VISIT EST AGE 40-64: CPT | Performed by: FAMILY MEDICINE

## 2019-03-28 PROCEDURE — G0145 SCR C/V CYTO,THINLAYER,RESCR: HCPCS | Performed by: FAMILY MEDICINE

## 2019-03-28 RX ORDER — MUPIROCIN CALCIUM 20 MG/G
CREAM TOPICAL 3 TIMES DAILY
Qty: 15 G | Refills: 0 | Status: SHIPPED | OUTPATIENT
Start: 2019-03-28 | End: 2019-03-28 | Stop reason: SDUPTHER

## 2019-03-28 RX ORDER — DIAPER,BRIEF,INFANT-TODD,DISP
EACH MISCELLANEOUS 4 TIMES DAILY PRN
Qty: 30 G | Refills: 0 | Status: SHIPPED | OUTPATIENT
Start: 2019-03-28 | End: 2019-03-28 | Stop reason: SDUPTHER

## 2019-03-28 RX ORDER — DIAPER,BRIEF,INFANT-TODD,DISP
EACH MISCELLANEOUS 4 TIMES DAILY PRN
Qty: 30 G | Refills: 0 | Status: SHIPPED | OUTPATIENT
Start: 2019-03-28

## 2019-03-28 RX ORDER — MUPIROCIN CALCIUM 20 MG/G
CREAM TOPICAL 3 TIMES DAILY
Qty: 15 G | Refills: 0 | Status: SHIPPED | OUTPATIENT
Start: 2019-03-28

## 2019-03-28 NOTE — TELEPHONE ENCOUNTER
Patient of Dr Christopher Vyas requesting meds called to [facility] today be changed to [facility] [place]  and remove [facility] from list

## 2019-03-28 NOTE — ASSESSMENT & PLAN NOTE
Give trial of topical steroid cream, advise patient that if it does not resolve in 2 weeks please call for referral to colorectal surgeon for possible excision

## 2019-03-28 NOTE — PROGRESS NOTES
Karla Grubbs 1965 female MRN: [id number]    Health Maintenance Visit    ASSESSMENT/PLAN  Problem List Items Addressed This Visit        Digestive    External hemorrhoid     Give trial of topical steroid cream, advise patient that if it does not resolve in 2 weeks please call for referral to colorectal surgeon for possible excision         Relevant Medications    hydrocortisone 1 % cream       Musculoskeletal and Integument    Facial abrasion, initial encounter     No infection noted  Advised patient to apply topical antibiotic twice daily until skin heals  Relevant Medications    mupirocin (BACTROBAN) 2 % cream    hydrocortisone 1 % cream       Genitourinary    Urinary tract infection with hematuria     Resolved with antibiotics, recheck urine to follow up on hematuria  Relevant Orders    UA w Reflex to Microscopic w Reflex to Culture -Lab Collect       Other    Health maintenance examination - Primary     Pap done, mammogram ordered, check screening labs         Iron deficiency anemia secondary to inadequate dietary iron intake     Prescribe iron, advise patient to take twice daily with vitamin C, recheck labs in 3 months  Relevant Orders    CBC and Platelet    Anterior chest wall pain     Given recent fall, advised patient to have chest XR to rule out rib fracture         Relevant Orders    XR chest pa & lateral      Other Visit Diagnoses     Screening for cervical cancer        Relevant Orders    Liquid-based pap, screening    Screening for breast cancer        Relevant Orders    Mammo screening bilateral w cad    Screening for cardiovascular condition        Relevant Orders    Lipid panel    Comprehensive metabolic panel            In addition to the above, the patient was counseled on general preventative health care subjects, including but not limited to:  - Nutrition, healthy weight, aerobic and weight-bearing exercise  - Mental health, social support, and self care    - Patient made aware of  services at the office  Full counseling on the many choices of family planning methods including none is provided, and all questions answered  Compliance is strongly emphasized  mammogram ordered, patient to schedule appointment    Most Recent Immunizations   Administered Date(s) Administered    Tuberculin Skin Test-PPD Intradermal 07/13/2018       Immunization status: up to date and documented  Future Appointments   Date Time Provider Brigitte Dill   6/24/2019 10:50 AM Bj Lam MD [facility] SAMMY Reilly [facility]            SUBJECTIVE    HPI:  Karla Cornell is a 52 y o  female who presents for a routine health maintenance visit  Patient had a fall when walking down stairs last Saturday  No LOC, did not feel pain until a few days later  Complains of L sided facial abrasion and HA, L sided anterior chest wall pain  Had episodes of vertigo with change of position from laying to sitting  Also noticed growth near anus with bowel movements  No bleeding  No diarrhea or constipation  Received reminder for mammogram but deferred due to recent injury  Also not up to date with Pap smear  Health Maintenance   Topic Date Due    Hepatitis C Screening  1965    Depression Screening PHQ  1965    BMI: Followup Plan  07/28/1981    Pneumococcal PPSV23 Medium Risk Adult (1 of 1 - PPSV23) 07/28/1982    DTaP,Tdap,and Td Vaccines (1 - Tdap) 07/28/1984    PAP SMEAR  07/28/1984    INFLUENZA VACCINE  07/01/2018    MAMMOGRAM  01/22/2020    BMI: Adult  03/28/2020    CRC Screening: Colonoscopy  01/20/2022    HEPATITIS B VACCINES  Aged Out       CRC screening: No personal or family history of colon cancer or colon polyps  BrCa screening: There is no personal or family history of breast cancer  She denies finding new breast lumps, breast pain or nipple discharge    CVS screening: Patient denies any exertional chest pain, dyspnea, palpitations, syncope, orthopnea, edema or paroxysmal nocturnal dyspnea  DM screening: No polyuria, polydipsia, blurry vision, chest pain, dyspnea or claudication  No foot burning, numbness or pain  No personal or family history of skin cancers or melanoma  Review of Systems   Constitutional: Negative for activity change, appetite change, fatigue and fever  Eyes: Negative for visual disturbance  Respiratory: Negative for cough, chest tightness and shortness of breath  Cardiovascular: Negative for chest pain and palpitations  Gastrointestinal: Negative for abdominal pain and blood in stool  Genitourinary:        Lump at anus   Musculoskeletal: Negative for back pain and neck pain  Anterior chest wall pain on the left   Neurological: Positive for dizziness and light-headedness  Negative for weakness  Hematological: Negative for adenopathy  Psychiatric/Behavioral: Negative for sleep disturbance  The patient is not nervous/anxious  Historical Information   No past medical history on file  [unfilled]  No past surgical history on file    Family History   Problem Relation Age of Onset    Skin cancer Father     No Known Problems Mother     No Known Problems Sister     No Known Problems Brother     No Known Problems Daughter     No Known Problems Son      Social History       Medications:    Current Outpatient Medications:     ferrous sulfate 325 (65 Fe) mg tablet, Take 1 tablet by mouth 2 (two) times a day, Disp: , Rfl:     hydrocortisone 1 % cream, Apply topically 4 (four) times a day as needed (for rectal hemorrhoids), Disp: 30 g, Rfl: 0    isoniazid (NYDRAZID) 300 mg tablet, Take 1 tablet (300 mg total) by mouth daily for 90 days, Disp: 90 tablet, Rfl: 1    Multiple Vitamins-Minerals (MULTIVITAMIN ADULT PO), Take 1 tablet by mouth daily, Disp: , Rfl:     mupirocin (BACTROBAN) 2 % cream, Apply topically 3 (three) times a day, Disp: 15 g, Rfl: 0    pyridoxine (VITAMIN B6) 50 mg tablet, Take 1 tablet (50 mg total) by mouth daily for 270 days, Disp: 90 tablet, Rfl: 0    pyridoxine (VITAMIN B6) 50 mg tablet, Take 1 tablet (50 mg total) by mouth daily for 90 days, Disp: 90 tablet, Rfl: 1    Allergies   Allergen Reactions    Motrin [Ibuprofen] Other (See Comments)     Upset stomach       OBJECTIVE  Vitals:   Vitals:    03/28/19 1002   BP: 118/68   BP Location: Right leg   Patient Position: Sitting   Cuff Size: Adult   Pulse: 76   Resp: 20   Temp: (!) 97 2 °F (36 2 °C)   Weight: 66 8 kg (147 lb 3 2 oz)   Height: 5' 2 9" (1 598 m)         Physical Exam   Constitutional: She is oriented to person, place, and time  She appears well-developed and well-nourished  HENT:   Head: Normocephalic  Right Ear: External ear normal    Left Ear: External ear normal    Nose: Nose normal    Mouth/Throat: Oropharynx is clear and moist    Eyes: Pupils are equal, round, and reactive to light  Conjunctivae and EOM are normal    Cardiovascular: Normal rate, regular rhythm, normal heart sounds and intact distal pulses  Pulmonary/Chest: Effort normal and breath sounds normal  She exhibits tenderness  Left side chest wall tenderness due to recent fall   Abdominal: Soft  Bowel sounds are normal    Genitourinary: Vagina normal and uterus normal    Genitourinary Comments: External hemorrhoid noted on the left side of anus about 1 5x0 5 cm   Musculoskeletal: Normal range of motion  Neurological: She is alert and oriented to person, place, and time  Skin: Skin is warm  abrasion noted on left side cheek   Psychiatric: She has a normal mood and affect

## 2019-04-04 LAB
LAB AP GYN PRIMARY INTERPRETATION: NORMAL
Lab: NORMAL

## 2019-05-28 ENCOUNTER — TRANSCRIBE ORDERS (OUTPATIENT)
Dept: ADMINISTRATIVE | Facility: HOSPITAL | Age: 54
End: 2019-05-28

## 2019-05-28 ENCOUNTER — TELEPHONE (OUTPATIENT)
Dept: FAMILY MEDICINE CLINIC | Facility: CLINIC | Age: 54
End: 2019-05-28

## 2019-05-28 DIAGNOSIS — Z12.31 VISIT FOR SCREENING MAMMOGRAM: Primary | ICD-10-CM

## 2019-05-28 DIAGNOSIS — Z12.39 BREAST SCREENING, UNSPECIFIED: Primary | ICD-10-CM

## 2019-06-05 ENCOUNTER — HOSPITAL ENCOUNTER (OUTPATIENT)
Dept: MAMMOGRAPHY | Facility: HOSPITAL | Age: 54
Discharge: HOME | End: 2019-06-05
Payer: COMMERCIAL

## 2019-06-05 VITALS — BODY MASS INDEX: 26.05 KG/M2 | HEIGHT: 63 IN | WEIGHT: 147 LBS

## 2019-06-05 DIAGNOSIS — Z12.39 BREAST SCREENING, UNSPECIFIED: ICD-10-CM

## 2019-06-05 PROCEDURE — 77063 BREAST TOMOSYNTHESIS BI: CPT

## 2019-06-05 PROCEDURE — 77067 SCR MAMMO BI INCL CAD: CPT

## 2019-06-08 LAB
ALBUMIN SERPL-MCNC: 4.4 G/DL (ref 3.6–5.1)
ALBUMIN/GLOB SERPL: 1.5 (CALC) (ref 1–2.5)
ALP SERPL-CCNC: 101 U/L (ref 33–130)
ALT SERPL-CCNC: 18 U/L (ref 6–29)
APPEARANCE UR: CLEAR
AST SERPL-CCNC: 17 U/L (ref 10–35)
BACTERIA UR QL AUTO: ABNORMAL /HPF
BASOPHILS # BLD AUTO: 28 CELLS/UL (ref 0–200)
BASOPHILS NFR BLD AUTO: 0.4 %
BILIRUB SERPL-MCNC: 1.6 MG/DL (ref 0.2–1.2)
BILIRUB UR QL STRIP: NEGATIVE
BUN SERPL-MCNC: 13 MG/DL (ref 7–25)
BUN/CREAT SERPL: ABNORMAL (CALC) (ref 6–22)
CALCIUM SERPL-MCNC: 9.4 MG/DL (ref 8.6–10.4)
CHLORIDE SERPL-SCNC: 103 MMOL/L (ref 98–110)
CHOLEST SERPL-MCNC: 148 MG/DL
CHOLEST/HDLC SERPL: 2.7 (CALC)
CO2 SERPL-SCNC: 29 MMOL/L (ref 20–32)
COLOR UR: ABNORMAL
CREAT SERPL-MCNC: 0.51 MG/DL (ref 0.5–1.05)
EOSINOPHIL # BLD AUTO: 228 CELLS/UL (ref 15–500)
EOSINOPHIL NFR BLD AUTO: 3.3 %
ERYTHROCYTE [DISTWIDTH] IN BLOOD BY AUTOMATED COUNT: 24.1 % (ref 11–15)
GLOBULIN SER CALC-MCNC: 3 G/DL (CALC) (ref 1.9–3.7)
GLUCOSE SERPL-MCNC: 82 MG/DL (ref 65–99)
GLUCOSE UR QL STRIP: NEGATIVE
HCT VFR BLD AUTO: 35.3 % (ref 35–45)
HDLC SERPL-MCNC: 55 MG/DL
HGB BLD-MCNC: 9.8 G/DL (ref 11.7–15.5)
HGB UR QL STRIP: NEGATIVE
HYALINE CASTS #/AREA URNS LPF: ABNORMAL /LPF
KETONES UR QL STRIP: NEGATIVE
LDLC SERPL CALC-MCNC: 73 MG/DL (CALC)
LEUKOCYTE ESTERASE UR QL STRIP: ABNORMAL
LYMPHOCYTES # BLD AUTO: 1649 CELLS/UL (ref 850–3900)
LYMPHOCYTES NFR BLD AUTO: 23.9 %
MCH RBC QN AUTO: 18.2 PG (ref 27–33)
MCHC RBC AUTO-ENTMCNC: 27.8 G/DL (ref 32–36)
MCV RBC AUTO: 65.5 FL (ref 80–100)
MONOCYTES # BLD AUTO: 338 CELLS/UL (ref 200–950)
MONOCYTES NFR BLD AUTO: 4.9 %
NEUTROPHILS # BLD AUTO: 4658 CELLS/UL (ref 1500–7800)
NEUTROPHILS NFR BLD AUTO: 67.5 %
NITRITE UR QL STRIP: NEGATIVE
NONHDLC SERPL-MCNC: 93 MG/DL (CALC)
PH UR STRIP: 6 [PH] (ref 5–8)
PLATELET # BLD AUTO: 324 THOUSAND/UL (ref 140–400)
POTASSIUM SERPL-SCNC: 4.2 MMOL/L (ref 3.5–5.3)
PROT SERPL-MCNC: 7.4 G/DL (ref 6.1–8.1)
PROT UR QL STRIP: NEGATIVE
RBC # BLD AUTO: 5.39 MILLION/UL (ref 3.8–5.1)
RBC #/AREA URNS HPF: ABNORMAL /HPF
SL AMB EGFR AFRICAN AMERICAN: 125 ML/MIN/1.73M2
SL AMB EGFR NON AFRICAN AMERICAN: 108 ML/MIN/1.73M2
SODIUM SERPL-SCNC: 139 MMOL/L (ref 135–146)
SP GR UR STRIP: 1.02 (ref 1–1.03)
SQUAMOUS #/AREA URNS HPF: ABNORMAL /HPF
TRIGL SERPL-MCNC: 113 MG/DL
WBC # BLD AUTO: 6.9 THOUSAND/UL (ref 3.8–10.8)
WBC #/AREA URNS HPF: ABNORMAL /HPF

## 2019-06-13 ENCOUNTER — OFFICE VISIT (OUTPATIENT)
Dept: FAMILY MEDICINE CLINIC | Facility: CLINIC | Age: 54
End: 2019-06-13

## 2019-06-13 VITALS
HEART RATE: 68 BPM | DIASTOLIC BLOOD PRESSURE: 70 MMHG | BODY MASS INDEX: 26.04 KG/M2 | SYSTOLIC BLOOD PRESSURE: 120 MMHG | RESPIRATION RATE: 16 BRPM | TEMPERATURE: 97.9 F | WEIGHT: 147 LBS

## 2019-06-13 DIAGNOSIS — D50.8 IRON DEFICIENCY ANEMIA SECONDARY TO INADEQUATE DIETARY IRON INTAKE: ICD-10-CM

## 2019-06-13 DIAGNOSIS — R76.11 POSITIVE PPD: Primary | ICD-10-CM

## 2019-06-13 DIAGNOSIS — N95.1 HOT FLASH, MENOPAUSAL: ICD-10-CM

## 2019-06-13 PROBLEM — S00.81XA FACIAL ABRASION, INITIAL ENCOUNTER: Status: RESOLVED | Noted: 2019-03-28 | Resolved: 2019-06-13

## 2019-06-13 PROCEDURE — 99214 OFFICE O/P EST MOD 30 MIN: CPT | Performed by: FAMILY MEDICINE

## 2019-10-09 LAB
BASOPHILS # BLD AUTO: 18 CELLS/UL (ref 0–200)
BASOPHILS NFR BLD AUTO: 0.3 %
EOSINOPHIL # BLD AUTO: 140 CELLS/UL (ref 15–500)
EOSINOPHIL NFR BLD AUTO: 2.3 %
ERYTHROCYTE [DISTWIDTH] IN BLOOD BY AUTOMATED COUNT: 22.7 % (ref 11–15)
HCT VFR BLD AUTO: 34 % (ref 35–45)
HGB BLD-MCNC: 9.1 G/DL (ref 11.7–15.5)
LYMPHOCYTES # BLD AUTO: 1305 CELLS/UL (ref 850–3900)
LYMPHOCYTES NFR BLD AUTO: 21.4 %
MCH RBC QN AUTO: 18.1 PG (ref 27–33)
MCHC RBC AUTO-ENTMCNC: 26.8 G/DL (ref 32–36)
MCV RBC AUTO: 67.5 FL (ref 80–100)
MONOCYTES # BLD AUTO: 323 CELLS/UL (ref 200–950)
MONOCYTES NFR BLD AUTO: 5.3 %
NEUTROPHILS # BLD AUTO: 4313 CELLS/UL (ref 1500–7800)
NEUTROPHILS NFR BLD AUTO: 70.7 %
PLATELET # BLD AUTO: 339 THOUSAND/UL (ref 140–400)
RBC # BLD AUTO: 5.04 MILLION/UL (ref 3.8–5.1)
WBC # BLD AUTO: 6.1 THOUSAND/UL (ref 3.8–10.8)

## 2019-11-04 DIAGNOSIS — D50.8 IRON DEFICIENCY ANEMIA SECONDARY TO INADEQUATE DIETARY IRON INTAKE: Primary | ICD-10-CM

## 2019-11-04 RX ORDER — FERROUS SULFATE 325(65) MG
1 TABLET ORAL 2 TIMES DAILY WITH MEALS
Qty: 180 TABLET | Refills: 1 | Status: SHIPPED | OUTPATIENT
Start: 2019-11-04

## 2019-11-04 NOTE — PROGRESS NOTES
Called and left message for patient about low hemoglobin  Advice patient to continue iron tab twice daily, also recheck lab in 3 months

## 2020-12-02 ENCOUNTER — TELEPHONE (OUTPATIENT)
Dept: FAMILY MEDICINE CLINIC | Facility: CLINIC | Age: 55
End: 2020-12-02

## 2020-12-16 ENCOUNTER — TELEPHONE (OUTPATIENT)
Dept: FAMILY MEDICINE CLINIC | Facility: CLINIC | Age: 55
End: 2020-12-16